# Patient Record
(demographics unavailable — no encounter records)

---

## 2025-05-02 NOTE — PHYSICAL EXAM
[JVD] : no jugular venous distention  [Normal Breath Sounds] : Normal breath sounds [Normal Heart Sounds] : normal heart sounds [No Rash or Lesion] : No rash or lesion [Alert] : alert [Oriented to Person] : oriented to person [Oriented to Place] : oriented to place [Oriented to Time] : oriented to time [Calm] : calm [de-identified] : NAD [de-identified] : NC/AT PER [de-identified] : R breast surgically absent [de-identified] : soft [de-identified] : no DVA tendneress [de-identified] : R arm swelling

## 2025-05-02 NOTE — CONSULT LETTER
[Dear  ___] : Dear  [unfilled], [Consult Letter:] : I had the pleasure of evaluating your patient, [unfilled]. [Please see my note below.] : Please see my note below. [Consult Closing:] : Thank you very much for allowing me to participate in the care of this patient.  If you have any questions, please do not hesitate to contact me. [Sincerely,] : Sincerely, [FreeTextEntry3] : Wm Andreas OAKLEY

## 2025-05-02 NOTE — HISTORY OF PRESENT ILLNESS
[de-identified] : 77 yo female with RUE swelling. R mastectomy in 2009. No RT. Swelling started post RUE DVT. Now swelling there x 1 year.. No intervention. Recent hospitalization at [facility] for encephalopathy.

## 2025-07-14 NOTE — ASSESSMENT
[FreeTextEntry1] : MRS TEODORO GRIER is a 78-year-old female referred by Dr. Lee for liver lesions. PMH of Breast cancer, DM, HLD, HTN, CKD, COPD, DVT (on Eliquis), CAD s/p stent ~2-3 yrs ago.   3/13/2025 MRI with GADIVIST ([facility]) showed a 4.3 x 4.1 cm hepatic segment 5 mass with imaging possibly representing focal nodular hyperplasia recommend confirmation. A 4.5 x 1.7 cm hematoma is the anterior right lower chest wall.  3/15/2025 MRI with EOVIST ([facility]) showed 4.3 x 4.1 cm segment 5 arterial enhancing lesion demonstrate isointense background on delayed postcontrast contrast images. Redemonstration of an intra-abdominal hematoma as seen on prior imaging.  6/7/25 MRI with GADIVIST - Infiltrating enhancing segment 5 mass, with overlying capsular retraction, strongly suspicious for cholangiocarcinoma. Hepatoma is considered less likely. This lesion is amenable to image guided core biopsy for definitive tissue characterization.   Patient presents for initial consultation. I reviewed her most recent scans, as well as scans from March from outside facility and compared them to a 2023 scan that we had in the system. This area in the liver on the recent scans look drastically different as compared to 2023.  She does have an MRI with EOVIST from March saying this lesion is most compatible with FNH, however it does not look definitive for this to my eye, and looks markedly different from 2023. I discussed that FNHs usually don't grow to this extent, therefore, I recommend a biopsy for further evaluation.  Her LFTs are WNL. I did discuss that her elevated ammonia levels could be related to fatty liver, which increases the risk of liver cancer. Her Ca19-9 is slightly elevated. Patient inquire how a biopsy is performed, I explained the pain management and hospital setting.  I will defer pre-biopsy workup to IR due to her age, PMH, and current anticoagulation. Plan to present this case at tumor board for consensus on treatment. She is following up with her nephrologist next week which I recommend she keep to discuss her diuretics. She recently had lab work drawn by the nephologist, we will obtain these for completeness's sake as IR will need to see this lab work prior to the liver biopsy as well.    Today, I personally spent 45 minutes in total time including reviewing imaging and studies, discussing complex treatment regimens, direct face to face time with the patient, patient education and counseling.

## 2025-07-14 NOTE — HISTORY OF PRESENT ILLNESS
[de-identified] : MRS TEODORO GRIER is a 78-year-old female referred by Dr. De Anda for liver lesions. History of Breast cancer, DM, HLD, HTN, CKD, COPD, DVT on Eliquis, CAD s/p stent ~2-3 yrs ago. No hepatitis hx. FMHx: brother had liver cancer.   Patient was admitted for 6 days (6/4/25-6/10/25) for increased confusion and AMS- toxic metabolic encephalopathy. Noted to have another UTI and apparently wasn't compliant with lactulose from her prior admission in April. Had DOMENICA on chronic CKD (creatinine 2.48), instructed to hold lasix.   Of note- was also admitted to [facility] 4/13-4/15 for elevated ammonia and hepatic encephalopathy from UTI. LFTs WNL at that time.   Workup during recent hospitalization included a 6/5/2025 a/p CT showed stable exam 5.3 indeterminate region of low attenuation right hepatic lobe. NO acute finding in the abdomen or pelvis on this non contrast exam.  6/7/25 MRI abdomen [facility] - Infiltrating enhancing segment 5 mass, with overlying capsular retraction, strongly suspicious for cholangiocarcinoma. Hepatoma is considered less likely. This lesion is amenable to image guided core biopsy for definitive tissue characterization.  6/8/25: Ca19-9: 56 AFP: 2.9 LFTs WNL Ammonia: 56 (was 186 on 6/5; and was 135 on 4/13/25 during prior admission at [facility])  Patient was evaluated by inpatient medical oncology and hepatology; thought less likely a cancer but recommended outpatient follow up with surgical oncology given the [facility] MRI read.    Prior workup: Patient was seen at [facility] in March 2025.  3/13/2025 MRI with GADAVIST showed a 4.3 x 4.1 cm hepatic segment 5 mass with imaging possibly representing focal nodular hyperplasia recommend confirmation. A 4.5 x 1.7 cm hematoma is the anterior right lower chest wall.  3/15/2025 MRI with EOVIST showed 4.3 x 4.1 cm segment 5 arterial enhancing lesion demonstrate isointense background on delayed postcontrast contrast images; compatible with an FNH.  Patient presents for initial consultation. Patient reports feeling well overall. Has been off the lasix due to her kidney function and has bilateral lower extremity edema and right upper extremity edema (related to prior breast surgery) since being off the Lasix. Has follow up with nephrology on 7/17/25. Patient is currently on Eliquis.

## 2025-07-14 NOTE — HISTORY OF PRESENT ILLNESS
[de-identified] : MRS TEODORO GRIER is a 78-year-old female referred by Dr. De Anda for liver lesions. History of Breast cancer, DM, HLD, HTN, CKD, COPD, DVT on Eliquis, CAD s/p stent ~2-3 yrs ago. No hepatitis hx. FMHx: brother had liver cancer.   Patient was admitted for 6 days (6/4/25-6/10/25) for increased confusion and AMS- toxic metabolic encephalopathy. Noted to have another UTI and apparently wasn't compliant with lactulose from her prior admission in April. Had DOMENICA on chronic CKD (creatinine 2.48), instructed to hold lasix.   Of note- was also admitted to [facility] 4/13-4/15 for elevated ammonia and hepatic encephalopathy from UTI. LFTs WNL at that time.   Workup during recent hospitalization included a 6/5/2025 a/p CT showed stable exam 5.3 indeterminate region of low attenuation right hepatic lobe. NO acute finding in the abdomen or pelvis on this non contrast exam.  6/7/25 MRI abdomen [facility] - Infiltrating enhancing segment 5 mass, with overlying capsular retraction, strongly suspicious for cholangiocarcinoma. Hepatoma is considered less likely. This lesion is amenable to image guided core biopsy for definitive tissue characterization.  6/8/25: Ca19-9: 56 AFP: 2.9 LFTs WNL Ammonia: 56 (was 186 on 6/5; and was 135 on 4/13/25 during prior admission at [facility])  Patient was evaluated by inpatient medical oncology and hepatology; thought less likely a cancer but recommended outpatient follow up with surgical oncology given the [facility] MRI read.    Prior workup: Patient was seen at [facility] in March 2025.  3/13/2025 MRI with G[facility]IST showed a 4.3 x 4.1 cm hepatic segment 5 mass with imaging possibly representing focal nodular hyperplasia recommend confirmation. A 4.5 x 1.7 cm hematoma is the anterior right lower chest wall.  3/15/2025 MRI with E[facility] showed 4.3 x 4.1 cm segment 5 arterial enhancing lesion demonstrate isointense background on delayed postcontrast contrast images; compatible with an FNH.  Patient presents for initial consultation. Patient reports feeling well overall. Has been off the lasix due to her kidney function and has bilateral lower extremity edema and right upper extremity edema (related to prior breast surgery) since being off the Lasix. Has follow up with nephrology on 7/17/25. Patient is currently on Eliquis.

## 2025-07-14 NOTE — END OF VISIT
[FreeTextEntry3] : By signing my name below, I, Car Amado, attest that this documentation has been prepared under the direction and in the presence of Lon Chery MD in the following sections HISTORY OF PRESENT ILLNESS; PAST MEDICAL/FAMILY/SOCIAL HISTORY; REVIEW OF SYSTEMS; PHYSICAL EXAM; ASSESSMENT/PLAN.

## 2025-07-14 NOTE — PHYSICAL EXAM
[FreeTextEntry1] : General: No acute distress. Well nourished and well kept. Upper right extremity edema and both lower extremity edema. Head: AT/NC Eyes: PERRL. EOMI. Pulmonary: No respiratory distress. Abdomen: Soft. NT/ND. No rebound, no guarding, no rigidity. No peritoneal signs. No masses. Neurological: A&O x 4. Psychiatric: Normal affect and mood.

## 2025-07-14 NOTE — ASSESSMENT
[FreeTextEntry1] : MRS TEODORO GRIER is a 78-year-old female referred by Dr. Lee for liver lesions. PMH of Breast cancer, DM, HLD, HTN, CKD, COPD, DVT (on Eliquis), CAD s/p stent ~2-3 yrs ago.   3/13/2025 MRI with GADIVIST ([facility]) showed a 4.3 x 4.1 cm hepatic segment 5 mass with imaging possibly representing focal nodular hyperplasia recommend confirmation. A 4.5 x 1.7 cm hematoma is the anterior right lower chest wall.  3/15/2025 MRI with EOVIST ([facility]) showed 4.3 x 4.1 cm segment 5 arterial enhancing lesion demonstrate isointense background on delayed postcontrast contrast images. Redemonstration of an intra-abdominal hematoma as seen on prior imaging.  6/7/25 MRI with [facility] - Infiltrating enhancing segment 5 mass, with overlying capsular retraction, strongly suspicious for cholangiocarcinoma. Hepatoma is considered less likely. This lesion is amenable to image guided core biopsy for definitive tissue characterization.   Patient presents for initial consultation. I reviewed her most recent scans, as well as scans from March from outside facility and compared them to a 2023 scan that we had in the system. This area in the liver on the recent scans look drastically different as compared to 2023.  She does have an MRI with E[facility] from March saying this lesion is most compatible with FNH, however it does not look definitive for this to my eye, and looks markedly different from 2023. I discussed that FNHs usually don't grow to this extent, therefore, I recommend a biopsy for further evaluation.  Her LFTs are WNL. I did discuss that her elevated ammonia levels could be related to fatty liver, which increases the risk of liver cancer. Her Ca19-9 is slightly elevated. Patient inquire how a biopsy is performed, I explained the pain management and hospital setting.  I will defer pre-biopsy workup to IR due to her age, PMH, and current anticoagulation. Plan to present this case at tumor board for consensus on treatment. She is following up with her nephrologist next week which I recommend she keep to discuss her diuretics. She recently had lab work drawn by the nephologist, we will obtain these for completeness's sake as IR will need to see this lab work prior to the liver biopsy as well.    Today, I personally spent 45 minutes in total time including reviewing imaging and studies, discussing complex treatment regimens, direct face to face time with the patient, patient education and counseling.